# Patient Record
Sex: MALE | Employment: FULL TIME | URBAN - METROPOLITAN AREA
[De-identification: names, ages, dates, MRNs, and addresses within clinical notes are randomized per-mention and may not be internally consistent; named-entity substitution may affect disease eponyms.]

---

## 2018-12-10 PROBLEM — G47.9 DISORDERED SLEEP: Status: ACTIVE | Noted: 2018-12-10

## 2018-12-10 PROBLEM — F32.A MILD DEPRESSION: Status: ACTIVE | Noted: 2018-12-10

## 2021-03-05 ENCOUNTER — TRANSCRIBE ORDER (OUTPATIENT)
Dept: OCCUPATIONAL MEDICINE | Age: 31
End: 2021-03-05

## 2021-03-05 ENCOUNTER — HOSPITAL ENCOUNTER (OUTPATIENT)
Dept: OCCUPATIONAL MEDICINE | Age: 31
Discharge: HOME | End: 2021-03-05

## 2021-03-05 DIAGNOSIS — T14.90XA INJURY: ICD-10-CM

## 2021-03-05 DIAGNOSIS — T14.90XA INJURY: Primary | ICD-10-CM

## 2022-03-19 PROBLEM — G47.9 DISORDERED SLEEP: Status: ACTIVE | Noted: 2018-12-10

## 2022-03-19 PROBLEM — F32.A MILD DEPRESSION: Status: ACTIVE | Noted: 2018-12-10

## 2022-11-17 ENCOUNTER — TELEPHONE (OUTPATIENT)
Dept: ORTHOPEDIC SURGERY | Age: 32
End: 2022-11-17

## 2022-11-17 ENCOUNTER — OFFICE VISIT (OUTPATIENT)
Dept: ORTHOPEDIC SURGERY | Age: 32
End: 2022-11-17
Payer: COMMERCIAL

## 2022-11-17 DIAGNOSIS — M75.42 IMPINGEMENT SYNDROME OF LEFT SHOULDER: ICD-10-CM

## 2022-11-17 DIAGNOSIS — S46.012D STRAIN OF MUSCLE(S) AND TENDON(S) OF THE ROTATOR CUFF OF LEFT SHOULDER, SUBSEQUENT ENCOUNTER: Primary | ICD-10-CM

## 2022-11-17 PROCEDURE — 20610 DRAIN/INJ JOINT/BURSA W/O US: CPT | Performed by: ORTHOPAEDIC SURGERY

## 2022-11-17 RX ORDER — TRIAMCINOLONE ACETONIDE 40 MG/ML
40 INJECTION, SUSPENSION INTRA-ARTICULAR; INTRAMUSCULAR ONCE
Status: COMPLETED | OUTPATIENT
Start: 2022-11-17 | End: 2022-11-17

## 2022-11-17 RX ORDER — DICLOFENAC SODIUM 75 MG/1
75 TABLET, DELAYED RELEASE ORAL 2 TIMES DAILY
Qty: 60 TABLET | Refills: 1 | Status: CANCELLED | OUTPATIENT
Start: 2022-11-17

## 2022-11-17 RX ADMIN — TRIAMCINOLONE ACETONIDE 40 MG: 40 INJECTION, SUSPENSION INTRA-ARTICULAR; INTRAMUSCULAR at 09:58

## 2022-11-17 NOTE — TELEPHONE ENCOUNTER
He was seen this morning and was supposed to have a rx sent but it's not at his pharmacy. Please call.

## 2022-11-17 NOTE — LETTER
[address],[facility] [address]  Phone: [phone]  Fax: [phone]    Jania Vázquez MD        November 17, 2022     Patient: Blake Wheeler   YOB: 1990   Date of Visit: 11/17/2022       To Whom It May Concern: It is my medical opinion that Blake Wheeler should remain out of work until 12/05/2022. He was evaluated today in the office, please excuse his absence due to the left shoulder until 12/05/2022. If you have any questions or concerns, please don't hesitate to call.     Sincerely,        Jania Vázquez MD

## 2022-11-17 NOTE — PROGRESS NOTES
Name: Norman Molina  YOB: 1990  Gender: male  MRN: [id number]      CC: Shoulder Pain (Left shoulder)       HPI: Norman Molina is a 33 y.o. male who returns for follow up on his left shoulder. He was evaluated and treated by me in May of 2021 for a rotator cuff strain. He was treated with a subacromial injection and physical therapy and was discharged in August, 2021. Today he states he never really reached 100% in regards to the shoulder. His main complaints today are with any repetitive motion and with reaching/overhead activities. He has reached out to his prior  but as of today this is under his personal insurance. He reports that he is taking a Medrol Dosepak which was provided by his primary care physician and has not noticed much improvement. Physical Examination:  General: no acute distress  Lungs: breathing easily  CV: regular rhythm by pulse  Left Shoulder: No obvious deformity of the biceps. Tenderness to palpation anterior shoulder in the bicipital groove. Active forward flexion to 170 degrees with pain in the extremes. Internal rotation with elbows at side equal bilaterally. External rotation with elbows at side resisted range of motion 5/5 strength. Pain with empty can testing with 4+/5 strength empty can position. Negative [place]'s, [place]'s. Pain with speeds. Assessment:     ICD-10-CM    1. Strain of muscle(s) and tendon(s) of the rotator cuff of left shoulder, subsequent encounter  S46.012D triamcinolone acetonide (KENALOG-40) injection 40 mg     ND ARTHROCENTESIS ASPIR&/INJ MAJOR JT/BURSA W/O US     Amb External Referral To Physical Therapy      2.  Impingement syndrome of left shoulder  M75.42 triamcinolone acetonide (KENALOG-40) injection 40 mg     ND ARTHROCENTESIS ASPIR&/INJ MAJOR JT/BURSA W/O US     Amb External Referral To Physical Therapy          Plan:   I think the majority of the patient's left shoulder pain is due to subacromial impingement and bursitis. I discussed with the patient conservative treatment options to include corticosteroid injection and formal physical therapy. I think he would benefit from combination of corticosteroid injection and formal physical therapy. He reports he is already attending formal physical therapy and I recommended that he continue doing so. The patient elected to proceed with a subacromial injection today. After verbal informed consent was obtained after sterile prep the left shoulder subacromial space was injected from a posterior lateral approach with 6 cc of 0.25% Marcaine and 1 cc of 40 mg Kenalog. The patient tolerated the procedure well was given postinjection flare precautions. Khoa Valderrama NP dictating as a scribe for MD Leigh Matta.  Rain Mcintyre MD, [address] [facility] and Sports Medicine

## 2022-11-18 RX ORDER — DICLOFENAC SODIUM 75 MG/1
75 TABLET, DELAYED RELEASE ORAL 2 TIMES DAILY
Qty: 60 TABLET | Refills: 0 | Status: SHIPPED | OUTPATIENT
Start: 2022-11-18

## 2022-12-02 ENCOUNTER — TELEPHONE (OUTPATIENT)
Dept: ORTHOPEDIC SURGERY | Age: 32
End: 2022-12-02

## 2022-12-02 NOTE — TELEPHONE ENCOUNTER
Dr. Verónica Estrella did not recommend any restrictions at the previous visit. We will discuss with him on Monday when he returns to the office. At this time, Mr. Naye Lazar is to return to work full duty on Monday.

## 2022-12-02 NOTE — TELEPHONE ENCOUNTER
He is supposed to return to work Monday. His employer is asking for a detailed note with his restrictions and weight restrictions as well. Please call him when its ready and he will pick it up.

## 2022-12-05 ENCOUNTER — TELEPHONE (OUTPATIENT)
Dept: ORTHOPEDIC SURGERY | Age: 32
End: 2022-12-05

## 2022-12-05 NOTE — TELEPHONE ENCOUNTER
He needs a note saying he can return with no restrictions. Can you email to him at [email or web address]. He says he is confused as to how he can say he can return with no restrictions when he hasn't seen him. He is asking for a call to discuss before doing the note.

## 2022-12-05 NOTE — TELEPHONE ENCOUNTER
I spoke with Mr. Brooklynn He and he is improving with physical therapy, but he does not feel like he can work his normal duties at this time. We have agreed on 3 hour work shifts, no repetitive overhead activity, and no lifting more than 5 pounds over head. A note was put in his [employer] account and will be emailed as well.

## 2023-01-17 ENCOUNTER — OFFICE VISIT (OUTPATIENT)
Dept: ORTHOPEDIC SURGERY | Age: 33
End: 2023-01-17

## 2023-01-17 DIAGNOSIS — M75.42 IMPINGEMENT SYNDROME OF LEFT SHOULDER: Primary | ICD-10-CM

## 2023-01-17 NOTE — PROGRESS NOTES
Name: Lynnette Shaw  YOB: 1990  Gender: male  MRN: [id number]      CC: Shoulder Pain (L Shoulder )       HPI: Lynnette Shaw is a 33 y.o. male who returns for follow up on  1/17/2023. He reports he is feeling better, he has gotten mobility back and is now working on regaining strength. He reported the pain medication he was put on during his last visit did not sit well with him so he did not continue taking it. He feels he is improving but he still having some occasional pain in popping type sensation. Physical Examination:  General: no acute distress  Lungs: breathing easily  CV: regular rhythm by pulse  Left Shoulder: Symmetric elevation abduction internal and external rotation. He has 5 out of 5 resisted rotator cuff strength no significant pain with impingement testing. Assessment:     ICD-10-CM    1. Impingement syndrome of left shoulder  M75.42           Plan:   I do think he is improving we will allow him to return to work with no lifting more than 30 pounds and no repetitive overhead activity with the left arm. These restrictions will be active for 8 weeks I will see him back in 8 weeks and hopefully with the restrictions and placement MMI            Shay Lawson MD, [address] [facility] and Sports Medicine

## 2023-01-17 NOTE — LETTER
[address] [place] [address],[facility] [address]  Phone: [phone]  Fax: [phone]    Janeth Carlson MD        January 17, 2023     Patient: Kaylen Monge   YOB: 1990   Date of Visit: 1/17/2023       To Whom It May Concern:    Appointment date:  January 17, 2023    Kaylen Monge was in our office for an appointment on the date listed above. Please excuse him/her from work on this day. Please see notes regarding work status:    no lifting more than 30 pounds and no repetitive overhead activity with the left arm. These restrictions will be active for 8 weeks     Follow-up appointment: 8 weeks     If you have any questions or concerns, please don't hesitate to call.     Sincerely,        Janeth Carlson MD

## 2023-03-14 ENCOUNTER — OFFICE VISIT (OUTPATIENT)
Dept: ORTHOPEDIC SURGERY | Age: 33
End: 2023-03-14
Payer: COMMERCIAL

## 2023-03-14 VITALS — BODY MASS INDEX: 45.1 KG/M2 | HEIGHT: 70 IN | WEIGHT: 315 LBS

## 2023-03-14 DIAGNOSIS — S46.012D STRAIN OF MUSCLE(S) AND TENDON(S) OF THE ROTATOR CUFF OF LEFT SHOULDER, SUBSEQUENT ENCOUNTER: Primary | ICD-10-CM

## 2023-03-14 DIAGNOSIS — M75.42 IMPINGEMENT SYNDROME OF LEFT SHOULDER: ICD-10-CM

## 2023-03-14 PROCEDURE — 99213 OFFICE O/P EST LOW 20 MIN: CPT | Performed by: ORTHOPAEDIC SURGERY

## 2023-03-14 RX ORDER — BUPROPION HYDROCHLORIDE 150 MG/1
TABLET ORAL
COMMUNITY
Start: 2023-03-09

## 2023-03-14 RX ORDER — AZITHROMYCIN 250 MG/1
TABLET, FILM COATED ORAL
COMMUNITY
Start: 2023-03-09

## 2023-03-14 RX ORDER — PREDNISONE 5 MG/1
TABLET ORAL
COMMUNITY
Start: 2023-03-09

## 2023-03-14 NOTE — PROGRESS NOTES
Name: iMchaelle Najera  YOB: 1990  Gender: male  MRN: [id number]      CC: Follow-up (Left shoulder )       HPI: Michaelle Najera is a 33 y.o. male who returns for follow up on  left shoulder reports that he feels as though physical therapy is helping and that his strength is improving however the pain has not been resolved. He continues to have left shoulder pain especially with notes difficulty reaching out. Physical Examination:  General: no acute distress  Lungs: breathing easily  CV: regular rhythm by pulse  Left Shoulder: No obvious deformity of the biceps. Active forward flexion to 180 degrees with pain in the extreme. External rotation with elbows at side equal bilaterally. External rotation with elbows at side resisted range of motion 5/5 strength. Pain with empty can testing but 5/5 strength in empty can position. Positive [place], Ne[place]'s. Pain with [place]'s and speeds. Assessment:     ICD-10-CM    1. Strain of muscle(s) and tendon(s) of the rotator cuff of left shoulder, subsequent encounter  S46.012D MRI SHOULDER LEFT WO CONTRAST     CANCELED: MRI SHOULDER LEFT WO CONTRAST      2. Impingement syndrome of left shoulder  M75.42 MRI SHOULDER LEFT WO CONTRAST     CANCELED: MRI SHOULDER LEFT WO CONTRAST          Plan:   Patient has continued left shoulder pain despite conservative treatment of multiple corticosteroid injections and extensive formal physical therapy. I think considering his continued left shoulder pain despite extensive conservative treatment to include multiple corticosteroid injections and formal physical therapy it is reasonable to evaluate this with a repeat MRI. It has been an extensive period of time since his previous MRI. Sadiq Brown MD, [facility] and Sports Medicine

## 2023-03-22 ENCOUNTER — HOSPITAL ENCOUNTER (OUTPATIENT)
Dept: MRI IMAGING | Age: 33
Discharge: HOME | End: 2023-03-25

## 2023-03-22 DIAGNOSIS — S46.012D STRAIN OF MUSCLE(S) AND TENDON(S) OF THE ROTATOR CUFF OF LEFT SHOULDER, SUBSEQUENT ENCOUNTER: ICD-10-CM

## 2023-03-22 DIAGNOSIS — M75.42 IMPINGEMENT SYNDROME OF LEFT SHOULDER: ICD-10-CM

## 2023-03-28 ENCOUNTER — OFFICE VISIT (OUTPATIENT)
Dept: ORTHOPEDIC SURGERY | Age: 33
End: 2023-03-28

## 2023-03-28 DIAGNOSIS — S46.012D STRAIN OF MUSCLE(S) AND TENDON(S) OF THE ROTATOR CUFF OF LEFT SHOULDER, SUBSEQUENT ENCOUNTER: ICD-10-CM

## 2023-03-28 DIAGNOSIS — M75.42 IMPINGEMENT SYNDROME OF LEFT SHOULDER: Primary | ICD-10-CM

## 2023-03-28 RX ORDER — TRIAMCINOLONE ACETONIDE 40 MG/ML
40 INJECTION, SUSPENSION INTRA-ARTICULAR; INTRAMUSCULAR ONCE
Status: COMPLETED | OUTPATIENT
Start: 2023-03-28 | End: 2023-03-28

## 2023-03-28 RX ADMIN — TRIAMCINOLONE ACETONIDE 40 MG: 40 INJECTION, SUSPENSION INTRA-ARTICULAR; INTRAMUSCULAR at 16:20

## 2023-03-28 NOTE — PROGRESS NOTES
today.  He was educated to monitor his response to the injection and I will see him back in about a month  After informed verbal consent was obtained the left shoulder glenohumeral joint was injected under ultrasound guidance in the longitudinal axis with 6 cc of 0.25% Marcaine and 1 cc of 40 mg Kenalog. Needle was localized to the glenohumeral joint and the capsule was seen to clearly distend. Images were saved to the ultrasound machine. The patient tolerated the injection well and was given postinjection flare precautions             Shay Buenrostro MD, [address] [place] [address] and Sports Medicine

## 2023-05-02 ENCOUNTER — OFFICE VISIT (OUTPATIENT)
Dept: ORTHOPEDIC SURGERY | Age: 33
End: 2023-05-02

## 2023-05-02 DIAGNOSIS — M75.42 IMPINGEMENT SYNDROME OF LEFT SHOULDER: Primary | ICD-10-CM

## 2023-05-02 DIAGNOSIS — S43.432A LABRAL TEAR OF SHOULDER, LEFT, INITIAL ENCOUNTER: ICD-10-CM

## 2023-05-02 DIAGNOSIS — S46.012D STRAIN OF MUSCLE(S) AND TENDON(S) OF THE ROTATOR CUFF OF LEFT SHOULDER, SUBSEQUENT ENCOUNTER: ICD-10-CM

## 2023-05-02 NOTE — PROGRESS NOTES
Name: Lea Bauman  YOB: 1990  Gender: male  MRN: [id number]      CC: Shoulder Pain (L)       HPI: Lea Bauman is a 33 y.o. male who returns for follow up on his L shoulder. He reports he had temporary relief from the injection the day of, however his pain returned the following day. Reports that he has continued clicking and popping and pain with movement of his shoulder. He reports that he has been performing a home exercise program and has decreased his formal physical therapy however he is still attending. Physical Examination:  General: no acute distress  Lungs: breathing easily  CV: regular rhythm by pulse  Left Shoulder: No obvious deformity of the biceps. Tenderness to palpation to the anterior shoulder around the bicipital groove. Active forward flexion to 180 degrees with pain in the extreme. External rotation with elbows at side equal bilaterally with pain in the extreme. External rotation with elbows at side resisted range of motion 5/5 strength. Negative empty can testing with 5/5 strength in the empty can position. Positive [place], negative Neer's. Pain with [place]'s. Negative speeds. Assessment:     ICD-10-CM    1. Impingement syndrome of left shoulder  M75.42 Ambulatory referral to Orthopedic Surgery      2. Strain of muscle(s) and tendon(s) of the rotator cuff of left shoulder, subsequent encounter  S46.012D Ambulatory referral to Orthopedic Surgery      3. Labral tear of shoulder, left, initial encounter  X73.966Y           Plan:   Patient with continued left shoulder pain despite considerable conservative treatment to include corticosteroid injections both intra-articularly and subacromially with formal physical therapy. I think the majority of his pain may be originating from the biceps labral pathology although there was no clinically definitive findings on MRI although this was degraded quality due to motion.   He also has had some

## 2023-08-03 NOTE — PROGRESS NOTES
Name: Elvira Barthel  YOB: 1990  Gender: male  MRN: [id number]  Date of Encounter:  8/4/2023       CHIEF COMPLAINT:     Chief Complaint   Patient presents with    Knee Pain     Bilateral   DOI: 7/18/23        SUBJECTIVE/OBJECTIVE:      HPI:    Patient is a 29 y.o. pleasant male who presents today for a new evaluation of his bilateral knee pain. 7/18/23 he fell off a bolted wall ladder and landed in a deep squat, with both knees hyperflexed and impacted a wood stage. He was able to ambulate after. He reports having swelling. He denies a pop or crack at the time of injury. He has pain primarily circumferentially about his patella. The right knee has some posterior medial pain. He has been taking naproxen and diclofenac gel. Most painful is getting up from sitting and walking. He denies instability or locking. PAST HISTORY:   Past medical, surgical, family, social history and allergies reviewed by me. Pertinent history:   Tobacco use:  reports that he has never smoked. He has never used smokeless tobacco.  Diabetes: none  Anticoagulation: no      REVIEW OF SYSTEMS:   As noted in HPI. PHYSICAL EXAMINATION:     Gen: Well-developed, no acute distress   HEENT: NC/AT, EOMI   Neck: Trachea midline, normal ROM   CV: Regular rhythm by palpation of distal pulse, normal capillary refill   Pulm: No respiratory distress, no stridor   Psychiatric: Well oriented, normal mood and affect. Skin: No rashes, lesions or ulcers, normal temperature, turgor, and texture on uninvolved extremity. ORTHO EXAM:    Right knee:      Inspection: No deformity, No ecchymosis, No erythema  Palpation: normal patellar mobility, no effusion, mild anterior crepitus  ROM: 130 flexion, 0 extension   Tenderness: Inferior patellar pole tenderness, no medial or lateral joint line tenderness, quad tendons, patellar facet tenderness  Provocative testing: (-) Lachman , Anterior drawer, Posterior drawer, No valgus laxity or

## 2023-08-04 ENCOUNTER — HOSPITAL ENCOUNTER (OUTPATIENT)
Dept: GENERAL RADIOLOGY | Age: 33
Discharge: HOME | End: 2023-08-04
Payer: COMMERCIAL

## 2023-08-04 ENCOUNTER — OFFICE VISIT (OUTPATIENT)
Dept: ORTHOPEDIC SURGERY | Age: 33
End: 2023-08-04

## 2023-08-04 DIAGNOSIS — S89.90XA KNEE INJURY, INITIAL ENCOUNTER: Primary | ICD-10-CM

## 2023-08-04 DIAGNOSIS — M25.562 ACUTE PAIN OF BOTH KNEES: ICD-10-CM

## 2023-08-04 DIAGNOSIS — M25.561 ACUTE PAIN OF BOTH KNEES: ICD-10-CM

## 2023-08-04 PROCEDURE — 73565 X-RAY EXAM OF KNEES: CPT

## 2023-09-01 ENCOUNTER — TELEPHONE (OUTPATIENT)
Dept: ORTHOPEDIC SURGERY | Age: 33
End: 2023-09-01

## 2023-09-01 NOTE — TELEPHONE ENCOUNTER
Left voicemail asking patient to return our call at his earliest convenience regarding his upcoming appointment on 9/6. Dr. Manas Doran will no longer be in clinic at his scheduled appointment time, so he needs to reschedule to either an earlier spot that morning or to another day.

## 2023-09-20 NOTE — PROGRESS NOTES
Name: Rayna Phoenix  YOB: 1990  Gender: male  MRN: [id number]  Date of Encounter:  9/21/2023       CHIEF COMPLAINT:     Chief Complaint   Patient presents with    Follow-up     Bilateral Knee        SUBJECTIVE/OBJECTIVE:      HPI:    Patient is a 29 y.o. pleasant male who presents today for a return evaluation of his bilateral knee. Working diagnosis:   1. Instability of both knee joints    2. Knee injury, unspecified laterality, subsequent encounter       LOV: 8/4/2023     Recall hx: 7/18/23 he fell off a bolted wall ladder and landed in a deep squat, with both knees hyperflexed and impacted a wood stage. He was able to ambulate after. He reports having swelling. He denies a pop or crack at the time of injury. He has pain primarily circumferentially about his patella. The right knee has some posterior medial pain. He has been taking naproxen and diclofenac gel. Most painful is getting up from sitting and walking. He denies instability or locking. Examination stable without effusion. XR reassuring. Mild right medial Chung pain, normal exam. Recommended NSAID use, HEP. Advised weight loss. He has continued to try to be active, doing exercises on land in the water. He feels like pain is improving but he has noted increased instability of the knees, where they are popping and clicking in the posterior knee especially on the right. He feels like the are moving more than normal. He has had one fall. He denies swelling. He feels pain posteriorly. PAST HISTORY:   Past medical, surgical, family, social history and allergies reviewed by me. Unchanged from prior visit. REVIEW OF SYSTEMS:   As noted in HPI.      PHYSICAL EXAMINATION:     Gen: Well-developed, no acute distress   HEENT: NC/AT, EOMI   Neck: Trachea midline, normal ROM   CV: Regular rhythm by palpation of distal pulse, normal capillary refill   Pulm: No respiratory distress, no stridor   Psychiatric: Well oriented, normal mood
No

## 2023-09-21 ENCOUNTER — OFFICE VISIT (OUTPATIENT)
Dept: ORTHOPEDIC SURGERY | Age: 33
End: 2023-09-21

## 2023-09-21 DIAGNOSIS — M25.361 INSTABILITY OF BOTH KNEE JOINTS: Primary | ICD-10-CM

## 2023-09-21 DIAGNOSIS — M25.362 INSTABILITY OF BOTH KNEE JOINTS: Primary | ICD-10-CM

## 2023-09-21 DIAGNOSIS — S89.90XD KNEE INJURY, UNSPECIFIED LATERALITY, SUBSEQUENT ENCOUNTER: ICD-10-CM

## 2023-09-26 ENCOUNTER — CLINICAL DOCUMENTATION (OUTPATIENT)
Dept: ORTHOPEDIC SURGERY | Age: 33
End: 2023-09-26

## 2023-10-25 ENCOUNTER — TELEPHONE (OUTPATIENT)
Dept: ORTHOPEDIC SURGERY | Age: 33
End: 2023-10-25

## 2023-10-25 NOTE — TELEPHONE ENCOUNTER
Attempted to call patient to schedule an MRI follow up appointment but prompted to leave a voicemail. Dr. Radha Baker has MRI f/u slots available next Tuesday (10/31).

## 2023-10-27 ENCOUNTER — TELEPHONE (OUTPATIENT)
Dept: ORTHOPEDIC SURGERY | Age: 33
End: 2023-10-27

## 2023-11-01 ENCOUNTER — OFFICE VISIT (OUTPATIENT)
Dept: ORTHOPEDIC SURGERY | Age: 33
End: 2023-11-01
Payer: COMMERCIAL

## 2023-11-01 DIAGNOSIS — S89.90XD KNEE INJURY, UNSPECIFIED LATERALITY, SUBSEQUENT ENCOUNTER: Primary | ICD-10-CM

## 2023-11-01 DIAGNOSIS — S83.419D SPRAIN OF MEDIAL COLLATERAL LIGAMENT OF KNEE, UNSPECIFIED LATERALITY, SUBSEQUENT ENCOUNTER: ICD-10-CM

## 2023-11-01 PROCEDURE — 99213 OFFICE O/P EST LOW 20 MIN: CPT | Performed by: STUDENT IN AN ORGANIZED HEALTH CARE EDUCATION/TRAINING PROGRAM

## 2023-11-01 NOTE — PROGRESS NOTES
Name: Rene Felipe  YOB: 1990  Gender: male  MRN: [id number]  Date of Encounter:  11/1/2023       CHIEF COMPLAINT:     Chief Complaint   Patient presents with    Results     Bilateral Knee MRI        SUBJECTIVE/OBJECTIVE:      HPI:    Patient is a 29 y.o. pleasant male who presents today for a return evaluation of his bilateral knee. Recall hx: 7/18/23 he fell off a bolted wall ladder and landed in a deep squat, with both knees hyperflexed and impacted a wood stage. He was able to ambulate after. He reports having swelling. He denies a pop or crack at the time of injury. He has pain primarily circumferentially about his patella. The right knee has some posterior medial pain. He has been taking naproxen and diclofenac gel. Most painful is getting up from sitting and walking. He denies instability or locking. Examination stable without effusion. XR reassuring. Mild right medial Chung pain, normal exam. Recommended NSAID use, HEP. Advised weight loss. He has been progressing well. He has no significant pain, just occasional times where he feels some discomfort medially in the knees now. He has had some popping and clicking at times. He is trying to be cautious about his knee activity. He is not on any work restrictions. PAST HISTORY:   Past medical, surgical, family, social history and allergies reviewed by me. Unchanged from prior visit. REVIEW OF SYSTEMS:   As noted in HPI. PHYSICAL EXAMINATION:     Gen: Well-developed, no acute distress   HEENT: NC/AT, EOMI   Neck: Trachea midline, normal ROM   CV: Regular rhythm by palpation of distal pulse, normal capillary refill   Pulm: No respiratory distress, no stridor   Psychiatric: Well oriented, normal mood and affect. Skin: No rashes, lesions or ulcers, normal temperature, turgor, and texture on uninvolved extremity. ORTHO EXAM:    Bilateral knee:       Inspection: No ecchymosis, No erythema  Palpation: normal patellar

## 2024-03-06 ENCOUNTER — HOSPITAL ENCOUNTER (EMERGENCY)
Age: 34
Discharge: HOME | End: 2024-03-06
Attending: EMERGENCY MEDICINE
Payer: COMMERCIAL

## 2024-03-06 ENCOUNTER — APPOINTMENT (OUTPATIENT)
Dept: GENERAL RADIOLOGY | Age: 34
End: 2024-03-06
Payer: COMMERCIAL

## 2024-03-06 VITALS
HEIGHT: 70 IN | TEMPERATURE: 98.4 F | SYSTOLIC BLOOD PRESSURE: 134 MMHG | WEIGHT: 315 LBS | DIASTOLIC BLOOD PRESSURE: 82 MMHG | HEART RATE: 69 BPM | BODY MASS INDEX: 45.1 KG/M2 | RESPIRATION RATE: 14 BRPM | OXYGEN SATURATION: 97 %

## 2024-03-06 DIAGNOSIS — R00.2 PALPITATIONS: ICD-10-CM

## 2024-03-06 DIAGNOSIS — R07.89 ATYPICAL CHEST PAIN: Primary | ICD-10-CM

## 2024-03-06 LAB
ANION GAP SERPL CALC-SCNC: 7 MMOL/L (ref 2–11)
BASOPHILS # BLD: 0 K/UL (ref 0–0.2)
BASOPHILS NFR BLD: 0 % (ref 0–2)
BUN SERPL-MCNC: 19 MG/DL (ref 6–23)
CALCIUM SERPL-MCNC: 9.1 MG/DL (ref 8.3–10.4)
CHLORIDE SERPL-SCNC: 108 MMOL/L (ref 103–113)
CO2 SERPL-SCNC: 27 MMOL/L (ref 21–32)
CREAT SERPL-MCNC: 1.11 MG/DL (ref 0.8–1.5)
D DIMER PPP FEU-MCNC: <0.27 UG/ML(FEU)
DIFFERENTIAL METHOD BLD: ABNORMAL
EKG ATRIAL RATE: 103 BPM
EKG DIAGNOSIS: NORMAL
EKG P AXIS: 62 DEGREES
EKG P-R INTERVAL: 132 MS
EKG Q-T INTERVAL: 328 MS
EKG QRS DURATION: 82 MS
EKG QTC CALCULATION (BAZETT): 429 MS
EKG R AXIS: -49 DEGREES
EKG T AXIS: 73 DEGREES
EKG VENTRICULAR RATE: 103 BPM
EOSINOPHIL # BLD: 0.1 K/UL (ref 0–0.8)
EOSINOPHIL NFR BLD: 1 % (ref 0.5–7.8)
ERYTHROCYTE [DISTWIDTH] IN BLOOD BY AUTOMATED COUNT: 13.2 % (ref 11.9–14.6)
GLUCOSE SERPL-MCNC: 96 MG/DL (ref 65–100)
HCT VFR BLD AUTO: 43.1 % (ref 41.1–50.3)
HGB BLD-MCNC: 14.4 G/DL (ref 13.6–17.2)
IMM GRANULOCYTES # BLD AUTO: 0 K/UL (ref 0–0.5)
IMM GRANULOCYTES NFR BLD AUTO: 0 % (ref 0–5)
LYMPHOCYTES # BLD: 2.8 K/UL (ref 0.5–4.6)
LYMPHOCYTES NFR BLD: 30 % (ref 13–44)
MCH RBC QN AUTO: 27.5 PG (ref 26.1–32.9)
MCHC RBC AUTO-ENTMCNC: 33.4 G/DL (ref 31.4–35)
MCV RBC AUTO: 82.4 FL (ref 82–102)
MONOCYTES # BLD: 0.4 K/UL (ref 0.1–1.3)
MONOCYTES NFR BLD: 4 % (ref 4–12)
NEUTS SEG # BLD: 5.9 K/UL (ref 1.7–8.2)
NEUTS SEG NFR BLD: 65 % (ref 43–78)
NRBC # BLD: 0 K/UL (ref 0–0.2)
PLATELET # BLD AUTO: 238 K/UL (ref 150–450)
PMV BLD AUTO: 8.2 FL (ref 9.4–12.3)
POTASSIUM SERPL-SCNC: 3.8 MMOL/L (ref 3.5–5.1)
RBC # BLD AUTO: 5.23 M/UL (ref 4.23–5.6)
SODIUM SERPL-SCNC: 142 MMOL/L (ref 136–146)
TROPONIN I SERPL HS-MCNC: 15 PG/ML (ref 0–14)
TROPONIN I SERPL HS-MCNC: 19.8 PG/ML (ref 0–14)
TROPONIN I SERPL HS-MCNC: 7.1 PG/ML (ref 0–14)
TSH W FREE THYROID IF ABNORMAL: 1.28 UIU/ML (ref 0.36–3.74)
WBC # BLD AUTO: 9.2 K/UL (ref 4.3–11.1)

## 2024-03-06 PROCEDURE — 99285 EMERGENCY DEPT VISIT HI MDM: CPT

## 2024-03-06 PROCEDURE — 93005 ELECTROCARDIOGRAM TRACING: CPT | Performed by: EMERGENCY MEDICINE

## 2024-03-06 PROCEDURE — 85379 FIBRIN DEGRADATION QUANT: CPT

## 2024-03-06 PROCEDURE — 93010 ELECTROCARDIOGRAM REPORT: CPT | Performed by: INTERNAL MEDICINE

## 2024-03-06 PROCEDURE — 84443 ASSAY THYROID STIM HORMONE: CPT

## 2024-03-06 PROCEDURE — 84484 ASSAY OF TROPONIN QUANT: CPT

## 2024-03-06 PROCEDURE — 71046 X-RAY EXAM CHEST 2 VIEWS: CPT

## 2024-03-06 PROCEDURE — 85025 COMPLETE CBC W/AUTO DIFF WBC: CPT

## 2024-03-06 PROCEDURE — 80048 BASIC METABOLIC PNL TOTAL CA: CPT

## 2024-03-06 ASSESSMENT — PAIN DESCRIPTION - LOCATION: LOCATION: CHEST

## 2024-03-06 ASSESSMENT — PAIN - FUNCTIONAL ASSESSMENT: PAIN_FUNCTIONAL_ASSESSMENT: 0-10

## 2024-03-06 ASSESSMENT — ENCOUNTER SYMPTOMS
SHORTNESS OF BREATH: 0
ABDOMINAL PAIN: 0
NAUSEA: 0

## 2024-03-06 ASSESSMENT — PAIN DESCRIPTION - DESCRIPTORS: DESCRIPTORS: PRESSURE

## 2024-03-06 ASSESSMENT — PAIN SCALES - GENERAL: PAINLEVEL_OUTOF10: 4

## 2024-03-06 NOTE — DISCHARGE INSTRUCTIONS
As discussed follow up with cardiology, I have placed a referral and you should be receiving a phone call regarding follow-up information.    Follow-up with your PCP in 1 to 2 days if no improvement.    Return to the ER for any new or worsening symptoms.

## 2024-03-06 NOTE — ED NOTES
I have reviewed discharge instructions with the patient.  The patient verbalized understanding.    Patient left ED via Discharge Method: ambulatory to Home with self    Opportunity for questions and clarification provided.       Patient given 0 scripts.         To continue your aftercare when you leave the hospital, you may receive an automated call from our care team to check in on how you are doing.  This is a free service and part of our promise to provide the best care and service to meet your aftercare needs.” If you have questions, or wish to unsubscribe from this service please call [phone].  Thank you for Choosing our [facility] Emergency Department.

## 2024-03-06 NOTE — ED PROVIDER NOTES
ED ATTENDING SHARED SERVICES NOTE:     I have seen and evaluated the patient and performed an independent history and physical exam, and I agree with the assessment and plan as documented in the advanced provider note.  I performed the history of present illness, physical exam, and medical decision making in its entirety.  With the following updates: 33-year-old male patient here with palpitations and atypical chest pain  Troponins with minimal increases over multiple checks 7 to 15-19  Patient is discussed with cardiology felt the patient is stable for discharge  I agree that the patient has had no EKG changes no ongoing symptoms and is remained clinically stable throughout his rather lengthy ER stay.                              Miguel Jeffery MD  03/06/24 [id number]    
MG TABLET    Take 220 mg by mouth 2 times daily (with meals)    PREDNISONE 5 MG (21) TBPK        VITAMIN D (CHOLECALCIFEROL) 25 MCG (1000 UT) TABS TABLET    Take by mouth daily        Results for orders placed or performed during the hospital encounter of 03/06/24   XR CHEST (2 VW)    Narrative    Chest X-ray    INDICATION: Chest pain/pressure    COMPARISON:  None    TECHNIQUE: 2 PA and lateral views of the chest were obtained.    FINDINGS: The lungs are clear. There are no infiltrates or effusions.  The heart  size is normal.  The bony thorax is intact.        Impression    No acute findings in the chest     Basic Metabolic Panel   Result Value Ref Range    Sodium 142 136 - 146 mmol/L    Potassium 3.8 3.5 - 5.1 mmol/L    Chloride 108 103 - 113 mmol/L    CO2 27 21 - 32 mmol/L    Anion Gap 7 2 - 11 mmol/L    Glucose 96 65 - 100 mg/dL    BUN 19 6 - 23 MG/DL    Creatinine 1.11 0.8 - 1.5 MG/DL    Est, Glom Filt Rate >60 >60 ml/min/1.73m2    Calcium 9.1 8.3 - 10.4 MG/DL   CBC with Auto Differential   Result Value Ref Range    WBC 9.2 4.3 - 11.1 K/uL    RBC 5.23 4.23 - 5.6 M/uL    Hemoglobin 14.4 13.6 - 17.2 g/dL    Hematocrit 43.1 41.1 - 50.3 %    MCV 82.4 82.0 - 102.0 FL    MCH 27.5 26.1 - 32.9 PG    MCHC 33.4 31.4 - 35.0 g/dL    RDW 13.2 11.9 - 14.6 %    Platelets 238 150 - 450 K/uL    MPV 8.2 (L) 9.4 - 12.3 FL    nRBC 0.00 0.0 - 0.2 K/uL    Differential Type AUTOMATED      Neutrophils % 65 43 - 78 %    Lymphocytes % 30 13 - 44 %    Monocytes % 4 4.0 - 12.0 %    Eosinophils % 1 0.5 - 7.8 %    Basophils % 0 0.0 - 2.0 %    Immature Granulocytes 0 0.0 - 5.0 %    Neutrophils Absolute 5.9 1.7 - 8.2 K/UL    Lymphocytes Absolute 2.8 0.5 - 4.6 K/UL    Monocytes Absolute 0.4 0.1 - 1.3 K/UL    Eosinophils Absolute 0.1 0.0 - 0.8 K/UL    Basophils Absolute 0.0 0.0 - 0.2 K/UL    Absolute Immature Granulocyte 0.0 0.0 - 0.5 K/UL   Troponin   Result Value Ref Range    Troponin, High Sensitivity 7.1 0 - 14 pg/mL   Troponin   Result

## 2024-04-15 ENCOUNTER — INITIAL CONSULT (OUTPATIENT)
Age: 34
End: 2024-04-15
Payer: COMMERCIAL

## 2024-04-15 VITALS
WEIGHT: 315 LBS | BODY MASS INDEX: 45.1 KG/M2 | DIASTOLIC BLOOD PRESSURE: 72 MMHG | SYSTOLIC BLOOD PRESSURE: 118 MMHG | HEART RATE: 69 BPM | HEIGHT: 70 IN

## 2024-04-15 DIAGNOSIS — R00.0 TACHYCARDIA: ICD-10-CM

## 2024-04-15 DIAGNOSIS — R00.2 PALPITATIONS: Primary | ICD-10-CM

## 2024-04-15 DIAGNOSIS — G47.30 SLEEP APNEA, UNSPECIFIED TYPE: ICD-10-CM

## 2024-04-15 PROCEDURE — 93000 ELECTROCARDIOGRAM COMPLETE: CPT | Performed by: INTERNAL MEDICINE

## 2024-04-15 PROCEDURE — 99204 OFFICE O/P NEW MOD 45 MIN: CPT | Performed by: INTERNAL MEDICINE

## 2024-04-15 RX ORDER — ALBUTEROL SULFATE 90 UG/1
2 AEROSOL, METERED RESPIRATORY (INHALATION) EVERY 6 HOURS PRN
COMMUNITY

## 2024-04-15 ASSESSMENT — ENCOUNTER SYMPTOMS
ABDOMINAL PAIN: 0
DOUBLE VISION: 0
BLOATING: 0
COUGH: 0
NAUSEA: 0
BLURRED VISION: 0
SHORTNESS OF BREATH: 0
ORTHOPNEA: 0
HEMOPTYSIS: 0
VOMITING: 0
BACK PAIN: 0

## 2024-04-15 NOTE — PROGRESS NOTES
Failure Neg Hx      Social History     Tobacco Use    Smoking status: Never    Smokeless tobacco: Never   Substance Use Topics    Alcohol use: Yes     Comment: occ     Current Outpatient Medications   Medication Sig Dispense Refill    albuterol sulfate HFA (PROVENTIL;VENTOLIN;PROAIR) 108 (90 Base) MCG/ACT inhaler Inhale 2 puffs into the lungs every 6 hours as needed for Wheezing or Shortness of Breath      buPROPion (WELLBUTRIN XL) 150 MG extended release tablet       vitamin D (CHOLECALCIFEROL) 25 MCG (1000 UT) TABS tablet Take by mouth daily      escitalopram (LEXAPRO) 10 MG tablet Take 1 tablet by mouth daily       No current facility-administered medications for this visit.       Review of Systems   Constitutional: Negative for chills, decreased appetite, fever, malaise/fatigue and weight gain.   HENT:  Negative for nosebleeds.    Eyes:  Negative for blurred vision and double vision.   Cardiovascular:  Positive for palpitations. Negative for chest pain, claudication, dyspnea on exertion, leg swelling, orthopnea, paroxysmal nocturnal dyspnea and syncope.   Respiratory:  Negative for cough, hemoptysis and shortness of breath.    Endocrine: Negative for cold intolerance and heat intolerance.   Hematologic/Lymphatic: Negative for bleeding problem.   Skin:  Negative for rash.   Musculoskeletal:  Negative for back pain, joint pain, muscle weakness and myalgias.   Gastrointestinal:  Negative for bloating, abdominal pain, nausea and vomiting.   Genitourinary:  Negative for dysuria.   Neurological:  Negative for dizziness, light-headedness and weakness.   Psychiatric/Behavioral:  Negative for altered mental status.        PHYSICAL EXAM:    /72   Pulse 69   Ht 1.778 m (5' 10\")   Wt (!) 164.7 kg (363 lb)   BMI 52.09 kg/m²      Physical Exam  Constitutional:       Appearance: Normal appearance.   HENT:      Head: Normocephalic and atraumatic.      Mouth/Throat:      Mouth: Mucous membranes are moist.   Eyes:

## 2024-07-19 ENCOUNTER — TELEPHONE (OUTPATIENT)
Age: 34
End: 2024-07-19

## 2024-07-19 ENCOUNTER — OFFICE VISIT (OUTPATIENT)
Age: 34
End: 2024-07-19
Payer: COMMERCIAL

## 2024-07-19 VITALS
HEART RATE: 62 BPM | HEIGHT: 71 IN | SYSTOLIC BLOOD PRESSURE: 118 MMHG | WEIGHT: 315 LBS | DIASTOLIC BLOOD PRESSURE: 88 MMHG | BODY MASS INDEX: 44.1 KG/M2

## 2024-07-19 DIAGNOSIS — R00.2 PALPITATIONS: Primary | ICD-10-CM

## 2024-07-19 DIAGNOSIS — R00.0 TACHYCARDIA: ICD-10-CM

## 2024-07-19 DIAGNOSIS — G47.30 SLEEP APNEA, UNSPECIFIED TYPE: ICD-10-CM

## 2024-07-19 PROCEDURE — 99214 OFFICE O/P EST MOD 30 MIN: CPT | Performed by: INTERNAL MEDICINE

## 2024-07-19 ASSESSMENT — ENCOUNTER SYMPTOMS
ORTHOPNEA: 0
NAUSEA: 0
BLOATING: 0
ABDOMINAL PAIN: 0
BLURRED VISION: 0
DOUBLE VISION: 0
BACK PAIN: 0
SHORTNESS OF BREATH: 0
VOMITING: 0
COUGH: 0
HEMOPTYSIS: 0

## 2024-07-19 NOTE — PROGRESS NOTES
consider ILR in the future if needed.  -Discussed if SVT noted, consideration for EP follow-up.  Refrain from caffeine.  Used to be on Adderall which he has stopped.  -Given prn lopressor    Palpitations  -See above    Sleep apnea  -Compliant with CPAP.  Also stressed weight loss.    Return in about 1 year (around 7/19/2025).     Elier North MD  7/19/2024  9:50 AM